# Patient Record
Sex: MALE | Race: WHITE | NOT HISPANIC OR LATINO | Employment: UNEMPLOYED | URBAN - NONMETROPOLITAN AREA
[De-identification: names, ages, dates, MRNs, and addresses within clinical notes are randomized per-mention and may not be internally consistent; named-entity substitution may affect disease eponyms.]

---

## 2017-01-01 ENCOUNTER — LAB REQUISITION (OUTPATIENT)
Dept: LAB | Facility: OTHER | Age: 52
End: 2017-01-01

## 2017-01-01 ENCOUNTER — LAB (OUTPATIENT)
Dept: LAB | Facility: OTHER | Age: 52
End: 2017-01-01

## 2017-01-01 ENCOUNTER — TELEPHONE (OUTPATIENT)
Dept: FAMILY MEDICINE CLINIC | Facility: CLINIC | Age: 52
End: 2017-01-01

## 2017-01-01 ENCOUNTER — OFFICE VISIT (OUTPATIENT)
Dept: FAMILY MEDICINE CLINIC | Facility: CLINIC | Age: 52
End: 2017-01-01

## 2017-01-01 VITALS
HEIGHT: 65 IN | SYSTOLIC BLOOD PRESSURE: 126 MMHG | DIASTOLIC BLOOD PRESSURE: 76 MMHG | HEART RATE: 105 BPM | WEIGHT: 141 LBS | OXYGEN SATURATION: 95 % | TEMPERATURE: 97.6 F | BODY MASS INDEX: 23.49 KG/M2

## 2017-01-01 DIAGNOSIS — N39.0 URINARY TRACT INFECTION: ICD-10-CM

## 2017-01-01 DIAGNOSIS — F10.10 ALCOHOL ABUSE: ICD-10-CM

## 2017-01-01 DIAGNOSIS — F17.200 TOBACCO DEPENDENCE SYNDROME: ICD-10-CM

## 2017-01-01 DIAGNOSIS — R10.13 EPIGASTRIC PAIN: Primary | ICD-10-CM

## 2017-01-01 DIAGNOSIS — B18.2 CHRONIC VIRAL HEPATITIS C (HCC): ICD-10-CM

## 2017-01-01 DIAGNOSIS — R60.1 GENERALIZED EDEMA: ICD-10-CM

## 2017-01-01 DIAGNOSIS — E87.6 HYPOKALEMIA: ICD-10-CM

## 2017-01-01 DIAGNOSIS — B18.0: ICD-10-CM

## 2017-01-01 DIAGNOSIS — Z12.12 SCREENING FOR COLORECTAL CANCER: ICD-10-CM

## 2017-01-01 DIAGNOSIS — Z12.11 SCREENING FOR COLORECTAL CANCER: ICD-10-CM

## 2017-01-01 DIAGNOSIS — K21.9 GASTROESOPHAGEAL REFLUX DISEASE WITHOUT ESOPHAGITIS: ICD-10-CM

## 2017-01-01 DIAGNOSIS — I10 ESSENTIAL (PRIMARY) HYPERTENSION: ICD-10-CM

## 2017-01-01 DIAGNOSIS — R05.9 COUGH: ICD-10-CM

## 2017-01-01 DIAGNOSIS — R10.13 EPIGASTRIC PAIN: ICD-10-CM

## 2017-01-01 DIAGNOSIS — I15.8 OTHER SECONDARY HYPERTENSION: ICD-10-CM

## 2017-01-01 DIAGNOSIS — Z00.00 PREVENTATIVE HEALTH CARE: ICD-10-CM

## 2017-01-01 DIAGNOSIS — J44.9 CHRONIC OBSTRUCTIVE PULMONARY DISEASE, UNSPECIFIED COPD TYPE (HCC): ICD-10-CM

## 2017-01-01 DIAGNOSIS — I10 ESSENTIAL HYPERTENSION: ICD-10-CM

## 2017-01-01 DIAGNOSIS — B37.0 ORAL THRUSH: ICD-10-CM

## 2017-01-01 DIAGNOSIS — E61.2 MAGNESIUM DEFICIENCY: ICD-10-CM

## 2017-01-01 DIAGNOSIS — F03.90 DEMENTIA WITHOUT BEHAVIORAL DISTURBANCE (HCC): ICD-10-CM

## 2017-01-01 DIAGNOSIS — D64.9 ANEMIA: ICD-10-CM

## 2017-01-01 DIAGNOSIS — E55.9 VITAMIN D DEFICIENCY: ICD-10-CM

## 2017-01-01 DIAGNOSIS — Z23 INFLUENZA VACCINE NEEDED: ICD-10-CM

## 2017-01-01 LAB
25(OH)D3 SERPL-MCNC: 13.2 NG/ML (ref 30–100)
25(OH)D3 SERPL-MCNC: 25.4 NG/ML (ref 30–100)
ALBUMIN SERPL-MCNC: 2.4 G/DL (ref 3.2–5.5)
ALBUMIN SERPL-MCNC: 2.4 G/DL (ref 3.2–5.5)
ALBUMIN/GLOB SERPL: 0.5 G/DL (ref 1–3)
ALBUMIN/GLOB SERPL: 0.7 G/DL (ref 1–3)
ALP SERPL-CCNC: 120 U/L (ref 15–121)
ALP SERPL-CCNC: 292 U/L (ref 15–121)
ALT SERPL W P-5'-P-CCNC: 25 U/L (ref 10–60)
ALT SERPL W P-5'-P-CCNC: 36 U/L (ref 10–60)
AMMONIA BLD-SCNC: 46 UMOL/L (ref 9–30)
AMMONIA BLD-SCNC: 53 UMOL/L (ref 9–30)
AMYLASE SERPL-CCNC: 76 U/L (ref 25–140)
ANION GAP SERPL CALCULATED.3IONS-SCNC: 10 MMOL/L (ref 5–15)
ANION GAP SERPL CALCULATED.3IONS-SCNC: 11 MMOL/L (ref 5–15)
ANION GAP SERPL CALCULATED.3IONS-SCNC: 14 MMOL/L (ref 5–15)
ANION GAP SERPL CALCULATED.3IONS-SCNC: 9 MMOL/L (ref 5–15)
ANISOCYTOSIS BLD QL: ABNORMAL
ANISOCYTOSIS BLD QL: NORMAL
ANISOCYTOSIS BLD QL: NORMAL
AST SERPL-CCNC: 115 U/L (ref 10–60)
AST SERPL-CCNC: 143 U/L (ref 10–60)
BACTERIA SPEC AEROBE CULT: NORMAL
BACTERIA UR QL AUTO: ABNORMAL /HPF
BASOPHILS # BLD AUTO: 0.05 10*3/MM3 (ref 0–0.2)
BASOPHILS NFR BLD AUTO: 0.4 % (ref 0–2)
BILIRUB SERPL-MCNC: 18.9 MG/DL (ref 0.2–1)
BILIRUB SERPL-MCNC: 5.2 MG/DL (ref 0.2–1)
BILIRUB UR QL STRIP: ABNORMAL
BUN BLD-MCNC: 10 MG/DL (ref 8–25)
BUN BLD-MCNC: 11 MG/DL (ref 8–25)
BUN BLD-MCNC: 12 MG/DL (ref 8–25)
BUN BLD-MCNC: 13 MG/DL (ref 8–25)
BUN BLD-MCNC: 22 MG/DL (ref 8–25)
BUN BLD-MCNC: <6 MG/DL (ref 8–25)
BUN/CREAT SERPL: 10.8 (ref 7–25)
BUN/CREAT SERPL: 12.9 (ref 7–25)
BUN/CREAT SERPL: 9.1 (ref 7–25)
BUN/CREAT SERPL: 9.2 (ref 7–25)
BUN/CREAT SERPL: 9.2 (ref 7–25)
BUN/CREAT SERPL: ABNORMAL (ref 7–25)
CALCIUM SPEC-SCNC: 8.3 MG/DL (ref 8.4–10.8)
CALCIUM SPEC-SCNC: 8.4 MG/DL (ref 8.4–10.8)
CALCIUM SPEC-SCNC: 8.5 MG/DL (ref 8.4–10.8)
CALCIUM SPEC-SCNC: 8.5 MG/DL (ref 8.4–10.8)
CALCIUM SPEC-SCNC: 8.6 MG/DL (ref 8.4–10.8)
CALCIUM SPEC-SCNC: 9.4 MG/DL (ref 8.4–10.8)
CHLORIDE SERPL-SCNC: 101 MMOL/L (ref 100–112)
CHLORIDE SERPL-SCNC: 101 MMOL/L (ref 100–112)
CHLORIDE SERPL-SCNC: 102 MMOL/L (ref 100–112)
CHLORIDE SERPL-SCNC: 103 MMOL/L (ref 100–112)
CHLORIDE SERPL-SCNC: 104 MMOL/L (ref 100–112)
CHLORIDE SERPL-SCNC: 99 MMOL/L (ref 100–112)
CLARITY UR: CLEAR
CO2 SERPL-SCNC: 20 MMOL/L (ref 20–32)
CO2 SERPL-SCNC: 21 MMOL/L (ref 20–32)
CO2 SERPL-SCNC: 24 MMOL/L (ref 20–32)
CO2 SERPL-SCNC: 24 MMOL/L (ref 20–32)
CO2 SERPL-SCNC: 25 MMOL/L (ref 20–32)
CO2 SERPL-SCNC: 27 MMOL/L (ref 20–32)
COLOR UR: ABNORMAL
CREAT BLD-MCNC: 0.7 MG/DL (ref 0.4–1.3)
CREAT BLD-MCNC: 1.1 MG/DL (ref 0.4–1.3)
CREAT BLD-MCNC: 1.2 MG/DL (ref 0.4–1.3)
CREAT BLD-MCNC: 1.2 MG/DL (ref 0.4–1.3)
CREAT BLD-MCNC: 1.3 MG/DL (ref 0.4–1.3)
CREAT BLD-MCNC: 1.7 MG/DL (ref 0.4–1.3)
DEPRECATED RDW RBC AUTO: 56.6 FL (ref 35.1–43.9)
DEPRECATED RDW RBC AUTO: 64.2 FL (ref 35.1–43.9)
DEPRECATED RDW RBC AUTO: 64.9 FL (ref 35.1–43.9)
DEPRECATED RDW RBC AUTO: 67.1 FL (ref 35.1–43.9)
DEPRECATED RDW RBC AUTO: 76.6 FL (ref 35.1–43.9)
DEPRECATED RDW RBC AUTO: 79.4 FL (ref 35.1–43.9)
EOSINOPHIL # BLD AUTO: 0.09 10*3/MM3 (ref 0–0.7)
EOSINOPHIL # BLD MANUAL: 0.08 10*3/MM3 (ref 0–0.7)
EOSINOPHIL # BLD MANUAL: 0.09 10*3/MM3 (ref 0–0.7)
EOSINOPHIL # BLD MANUAL: 0.14 10*3/MM3 (ref 0–0.7)
EOSINOPHIL # BLD MANUAL: 0.15 10*3/MM3 (ref 0–0.7)
EOSINOPHIL # BLD MANUAL: 0.18 10*3/MM3 (ref 0–0.7)
EOSINOPHIL NFR BLD AUTO: 0.8 % (ref 0–7)
EOSINOPHIL NFR BLD MANUAL: 1 % (ref 0–7)
EOSINOPHIL NFR BLD MANUAL: 2 % (ref 0–7)
EOSINOPHIL NFR BLD MANUAL: 2 % (ref 0–7)
ERYTHROCYTE [DISTWIDTH] IN BLOOD BY AUTOMATED COUNT: 15.5 % (ref 11.5–14.5)
ERYTHROCYTE [DISTWIDTH] IN BLOOD BY AUTOMATED COUNT: 17.6 % (ref 11.5–14.5)
ERYTHROCYTE [DISTWIDTH] IN BLOOD BY AUTOMATED COUNT: 18.7 % (ref 11.5–14.5)
ERYTHROCYTE [DISTWIDTH] IN BLOOD BY AUTOMATED COUNT: 20.3 % (ref 11.5–14.5)
ERYTHROCYTE [DISTWIDTH] IN BLOOD BY AUTOMATED COUNT: 21.7 % (ref 11.5–14.5)
ERYTHROCYTE [DISTWIDTH] IN BLOOD BY AUTOMATED COUNT: 22.7 % (ref 11.5–14.5)
GFR SERPL CREATININE-BSD FRML MDRD: 119 ML/MIN/1.73 (ref 56–130)
GFR SERPL CREATININE-BSD FRML MDRD: 43 ML/MIN/1.73 (ref 56–130)
GFR SERPL CREATININE-BSD FRML MDRD: 58 ML/MIN/1.73 (ref 56–130)
GFR SERPL CREATININE-BSD FRML MDRD: 64 ML/MIN/1.73 (ref 56–130)
GFR SERPL CREATININE-BSD FRML MDRD: 64 ML/MIN/1.73 (ref 56–130)
GFR SERPL CREATININE-BSD FRML MDRD: 71 ML/MIN/1.73 (ref 56–130)
GLOBULIN UR ELPH-MCNC: 3.4 GM/DL (ref 2.5–4.6)
GLOBULIN UR ELPH-MCNC: 4.8 GM/DL (ref 2.5–4.6)
GLUCOSE BLD-MCNC: 117 MG/DL (ref 70–100)
GLUCOSE BLD-MCNC: 85 MG/DL (ref 70–100)
GLUCOSE BLD-MCNC: 86 MG/DL (ref 70–100)
GLUCOSE BLD-MCNC: 91 MG/DL (ref 70–100)
GLUCOSE BLD-MCNC: 95 MG/DL (ref 70–100)
GLUCOSE BLD-MCNC: 95 MG/DL (ref 70–100)
GLUCOSE UR STRIP-MCNC: NEGATIVE MG/DL
HAV IGM SERPL QL IA: NEGATIVE
HBV CORE IGM SERPL QL IA: NEGATIVE
HBV SURFACE AG SERPL QL IA: NEGATIVE
HCT VFR BLD AUTO: 19.5 % (ref 39–49)
HCT VFR BLD AUTO: 20.7 % (ref 39–49)
HCT VFR BLD AUTO: 23.6 % (ref 39–49)
HCT VFR BLD AUTO: 26 % (ref 39–49)
HCT VFR BLD AUTO: 27.7 % (ref 39–49)
HCT VFR BLD AUTO: 35.9 % (ref 39–49)
HCV AB SER DONR QL: NEGATIVE
HGB BLD-MCNC: 13 G/DL (ref 13.7–17.3)
HGB BLD-MCNC: 6.5 G/DL (ref 13.7–17.3)
HGB BLD-MCNC: 6.9 G/DL (ref 13.7–17.3)
HGB BLD-MCNC: 7.7 G/DL (ref 13.7–17.3)
HGB BLD-MCNC: 8.7 G/DL (ref 13.7–17.3)
HGB BLD-MCNC: 9.7 G/DL (ref 13.7–17.3)
HGB UR QL STRIP.AUTO: NEGATIVE
HIV1+2 AB SER QL: NEGATIVE
HYALINE CASTS UR QL AUTO: ABNORMAL /LPF
KETONES UR QL STRIP: ABNORMAL
LEUKOCYTE ESTERASE UR QL STRIP.AUTO: NEGATIVE
LIPASE SERPL-CCNC: 93 U/L (ref 23–300)
LYMPHOCYTES # BLD AUTO: 2.06 10*3/MM3 (ref 0.6–4.2)
LYMPHOCYTES # BLD MANUAL: 1.28 10*3/MM3 (ref 0.6–4.2)
LYMPHOCYTES # BLD MANUAL: 1.85 10*3/MM3 (ref 0.6–4.2)
LYMPHOCYTES # BLD MANUAL: 2.17 10*3/MM3 (ref 0.6–4.2)
LYMPHOCYTES # BLD MANUAL: 2.27 10*3/MM3 (ref 0.6–4.2)
LYMPHOCYTES # BLD MANUAL: 2.31 10*3/MM3 (ref 0.6–4.2)
LYMPHOCYTES NFR BLD AUTO: 18.2 % (ref 10–50)
LYMPHOCYTES NFR BLD MANUAL: 11 % (ref 0–12)
LYMPHOCYTES NFR BLD MANUAL: 12 % (ref 10–50)
LYMPHOCYTES NFR BLD MANUAL: 13 % (ref 0–12)
LYMPHOCYTES NFR BLD MANUAL: 14 % (ref 10–50)
LYMPHOCYTES NFR BLD MANUAL: 15 % (ref 0–12)
LYMPHOCYTES NFR BLD MANUAL: 26 % (ref 10–50)
LYMPHOCYTES NFR BLD MANUAL: 28 % (ref 10–50)
LYMPHOCYTES NFR BLD MANUAL: 31 % (ref 10–50)
LYMPHOCYTES NFR BLD MANUAL: 6 % (ref 0–12)
LYMPHOCYTES NFR BLD MANUAL: 7 % (ref 0–12)
MACROCYTES BLD QL SMEAR: ABNORMAL
MACROCYTES BLD QL SMEAR: ABNORMAL
MAGNESIUM SERPL-MCNC: 2 MG/DL (ref 1.8–2.5)
MCH RBC QN AUTO: 31.8 PG (ref 26.5–34)
MCH RBC QN AUTO: 32.8 PG (ref 26.5–34)
MCH RBC QN AUTO: 33.3 PG (ref 26.5–34)
MCH RBC QN AUTO: 34.5 PG (ref 26.5–34)
MCH RBC QN AUTO: 36.7 PG (ref 26.5–34)
MCH RBC QN AUTO: 37.4 PG (ref 26.5–34)
MCHC RBC AUTO-ENTMCNC: 32.6 G/DL (ref 31.5–36.3)
MCHC RBC AUTO-ENTMCNC: 33.3 G/DL (ref 31.5–36.3)
MCHC RBC AUTO-ENTMCNC: 33.3 G/DL (ref 31.5–36.3)
MCHC RBC AUTO-ENTMCNC: 33.5 G/DL (ref 31.5–36.3)
MCHC RBC AUTO-ENTMCNC: 35 G/DL (ref 31.5–36.3)
MCHC RBC AUTO-ENTMCNC: 36.2 G/DL (ref 31.5–36.3)
MCV RBC AUTO: 102.2 FL (ref 80–98)
MCV RBC AUTO: 103.2 FL (ref 80–98)
MCV RBC AUTO: 103.5 FL (ref 80–98)
MCV RBC AUTO: 104.9 FL (ref 80–98)
MCV RBC AUTO: 94.9 FL (ref 80–98)
MCV RBC AUTO: 98.5 FL (ref 80–98)
MONOCYTES # BLD AUTO: 0.57 10*3/MM3 (ref 0–0.9)
MONOCYTES # BLD AUTO: 0.77 10*3/MM3 (ref 0–0.9)
MONOCYTES # BLD AUTO: 0.92 10*3/MM3 (ref 0–0.9)
MONOCYTES # BLD AUTO: 1.19 10*3/MM3 (ref 0–0.9)
MONOCYTES # BLD AUTO: 1.33 10*3/MM3 (ref 0–0.9)
MONOCYTES # BLD AUTO: 1.58 10*3/MM3 (ref 0–0.9)
MONOCYTES NFR BLD AUTO: 14 % (ref 0–12)
MUCOUS THREADS URNS QL MICRO: ABNORMAL /HPF
NEUTROPHILS # BLD AUTO: 12.47 10*3/MM3 (ref 2–8.6)
NEUTROPHILS # BLD AUTO: 3.93 10*3/MM3 (ref 2–8.6)
NEUTROPHILS # BLD AUTO: 5.07 10*3/MM3 (ref 2–8.6)
NEUTROPHILS # BLD AUTO: 5.18 10*3/MM3 (ref 2–8.6)
NEUTROPHILS # BLD AUTO: 6.6 10*3/MM3 (ref 2–8.6)
NEUTROPHILS # BLD AUTO: 7.51 10*3/MM3 (ref 2–8.6)
NEUTROPHILS NFR BLD AUTO: 66.6 % (ref 37–80)
NEUTROPHILS NFR BLD MANUAL: 56 % (ref 37–80)
NEUTROPHILS NFR BLD MANUAL: 56 % (ref 37–80)
NEUTROPHILS NFR BLD MANUAL: 64 % (ref 37–80)
NEUTROPHILS NFR BLD MANUAL: 68 % (ref 37–80)
NEUTROPHILS NFR BLD MANUAL: 72 % (ref 37–80)
NEUTS BAND NFR BLD MANUAL: 1 % (ref 0–5)
NEUTS BAND NFR BLD MANUAL: 4 % (ref 0–5)
NEUTS BAND NFR BLD MANUAL: 9 % (ref 0–5)
NEUTS HYPERSEG # BLD: ABNORMAL 10*3/UL
NITRITE UR QL STRIP: POSITIVE
PH UR STRIP.AUTO: 6 [PH] (ref 5.5–8)
PLAT MORPH BLD: NORMAL
PLAT MORPH BLD: NORMAL
PLATELET # BLD AUTO: 103 10*3/MM3 (ref 150–450)
PLATELET # BLD AUTO: 127 10*3/MM3 (ref 150–450)
PLATELET # BLD AUTO: 136 10*3/MM3 (ref 150–450)
PLATELET # BLD AUTO: 161 10*3/MM3 (ref 150–450)
PLATELET # BLD AUTO: 170 10*3/MM3 (ref 150–450)
PLATELET # BLD AUTO: 203 10*3/MM3 (ref 150–450)
PMV BLD AUTO: 10 FL (ref 8–12)
PMV BLD AUTO: 10 FL (ref 8–12)
PMV BLD AUTO: 10.1 FL (ref 8–12)
PMV BLD AUTO: 10.4 FL (ref 8–12)
PMV BLD AUTO: 10.7 FL (ref 8–12)
PMV BLD AUTO: 11.2 FL (ref 8–12)
POTASSIUM BLD-SCNC: 2.7 MMOL/L (ref 3.4–5.4)
POTASSIUM BLD-SCNC: 3 MMOL/L (ref 3.4–5.4)
POTASSIUM BLD-SCNC: 3.2 MMOL/L (ref 3.4–5.4)
POTASSIUM BLD-SCNC: 3.3 MMOL/L (ref 3.4–5.4)
POTASSIUM BLD-SCNC: 3.5 MMOL/L (ref 3.4–5.4)
POTASSIUM BLD-SCNC: 4.6 MMOL/L (ref 3.4–5.4)
PROT SERPL-MCNC: 5.8 G/DL (ref 6.7–8.2)
PROT SERPL-MCNC: 7.2 G/DL (ref 6.7–8.2)
PROT UR QL STRIP: ABNORMAL
RBC # BLD AUTO: 1.98 10*6/MM3 (ref 4.37–5.74)
RBC # BLD AUTO: 2 10*6/MM3 (ref 4.37–5.74)
RBC # BLD AUTO: 2.31 10*6/MM3 (ref 4.37–5.74)
RBC # BLD AUTO: 2.64 10*6/MM3 (ref 4.37–5.74)
RBC # BLD AUTO: 2.74 10*6/MM3 (ref 4.37–5.74)
RBC # BLD AUTO: 3.48 10*6/MM3 (ref 4.37–5.74)
RBC # UR: ABNORMAL /HPF
REF LAB TEST METHOD: ABNORMAL
SCAN SLIDE: NORMAL
SMALL PLATELETS BLD QL SMEAR: ABNORMAL
SMALL PLATELETS BLD QL SMEAR: NORMAL
SMALL PLATELETS BLD QL SMEAR: NORMAL
SODIUM BLD-SCNC: 134 MMOL/L (ref 134–146)
SODIUM BLD-SCNC: 134 MMOL/L (ref 134–146)
SODIUM BLD-SCNC: 135 MMOL/L (ref 134–146)
SODIUM BLD-SCNC: 136 MMOL/L (ref 134–146)
SODIUM BLD-SCNC: 138 MMOL/L (ref 134–146)
SODIUM BLD-SCNC: 138 MMOL/L (ref 134–146)
SP GR UR STRIP: 1.01 (ref 1–1.03)
SQUAMOUS #/AREA URNS HPF: ABNORMAL /HPF
TARGETS BLD QL SMEAR: ABNORMAL
TARGETS BLD QL SMEAR: ABNORMAL
TSH SERPL DL<=0.05 MIU/L-ACNC: 2.77 MIU/ML (ref 0.46–4.68)
UROBILINOGEN UR QL STRIP: ABNORMAL
VIT B12 BLD-MCNC: 992 PG/ML (ref 239–931)
WBC MORPH BLD: NORMAL
WBC NRBC COR # BLD: 11.29 10*3/MM3 (ref 3.2–9.8)
WBC NRBC COR # BLD: 15.39 10*3/MM3 (ref 3.2–9.8)
WBC NRBC COR # BLD: 7.01 10*3/MM3 (ref 3.2–9.8)
WBC NRBC COR # BLD: 8.1 10*3/MM3 (ref 3.2–9.8)
WBC NRBC COR # BLD: 8.89 10*3/MM3 (ref 3.2–9.8)
WBC NRBC COR # BLD: 9.16 10*3/MM3 (ref 3.2–9.8)
WBC UR QL AUTO: ABNORMAL /HPF

## 2017-01-01 PROCEDURE — 80048 BASIC METABOLIC PNL TOTAL CA: CPT | Performed by: INTERNAL MEDICINE

## 2017-01-01 PROCEDURE — 90471 IMMUNIZATION ADMIN: CPT | Performed by: FAMILY MEDICINE

## 2017-01-01 PROCEDURE — 85025 COMPLETE CBC W/AUTO DIFF WBC: CPT | Performed by: INTERNAL MEDICINE

## 2017-01-01 PROCEDURE — 83690 ASSAY OF LIPASE: CPT | Performed by: FAMILY MEDICINE

## 2017-01-01 PROCEDURE — 82140 ASSAY OF AMMONIA: CPT | Performed by: INTERNAL MEDICINE

## 2017-01-01 PROCEDURE — 83735 ASSAY OF MAGNESIUM: CPT | Performed by: INTERNAL MEDICINE

## 2017-01-01 PROCEDURE — 84443 ASSAY THYROID STIM HORMONE: CPT | Performed by: INTERNAL MEDICINE

## 2017-01-01 PROCEDURE — 82306 VITAMIN D 25 HYDROXY: CPT | Performed by: FAMILY MEDICINE

## 2017-01-01 PROCEDURE — 85025 COMPLETE CBC W/AUTO DIFF WBC: CPT | Performed by: FAMILY MEDICINE

## 2017-01-01 PROCEDURE — 85007 BL SMEAR W/DIFF WBC COUNT: CPT | Performed by: INTERNAL MEDICINE

## 2017-01-01 PROCEDURE — 82150 ASSAY OF AMYLASE: CPT | Performed by: FAMILY MEDICINE

## 2017-01-01 PROCEDURE — 80053 COMPREHEN METABOLIC PANEL: CPT | Performed by: INTERNAL MEDICINE

## 2017-01-01 PROCEDURE — 90686 IIV4 VACC NO PRSV 0.5 ML IM: CPT | Performed by: FAMILY MEDICINE

## 2017-01-01 PROCEDURE — G0432 EIA HIV-1/HIV-2 SCREEN: HCPCS | Performed by: INTERNAL MEDICINE

## 2017-01-01 PROCEDURE — 82607 VITAMIN B-12: CPT | Performed by: INTERNAL MEDICINE

## 2017-01-01 PROCEDURE — 81001 URINALYSIS AUTO W/SCOPE: CPT | Performed by: INTERNAL MEDICINE

## 2017-01-01 PROCEDURE — 80074 ACUTE HEPATITIS PANEL: CPT | Performed by: INTERNAL MEDICINE

## 2017-01-01 PROCEDURE — 82306 VITAMIN D 25 HYDROXY: CPT | Performed by: INTERNAL MEDICINE

## 2017-01-01 PROCEDURE — 99214 OFFICE O/P EST MOD 30 MIN: CPT | Performed by: FAMILY MEDICINE

## 2017-01-01 PROCEDURE — 87086 URINE CULTURE/COLONY COUNT: CPT | Performed by: INTERNAL MEDICINE

## 2017-01-01 PROCEDURE — 80053 COMPREHEN METABOLIC PANEL: CPT | Performed by: FAMILY MEDICINE

## 2017-01-01 RX ORDER — POTASSIUM CHLORIDE 20 MEQ/1
20 TABLET, EXTENDED RELEASE ORAL DAILY
Qty: 30 TABLET | Refills: 5 | Status: SHIPPED | OUTPATIENT
Start: 2017-01-01 | End: 2017-01-01

## 2017-01-01 RX ORDER — ONDANSETRON 4 MG/1
TABLET, ORALLY DISINTEGRATING ORAL
Qty: 30 TABLET | Refills: 0 | Status: CANCELLED | OUTPATIENT
Start: 2017-01-01

## 2017-01-01 RX ORDER — ALBUTEROL SULFATE 90 UG/1
2 AEROSOL, METERED RESPIRATORY (INHALATION) EVERY 6 HOURS PRN
Qty: 8 G | Refills: 12 | Status: SHIPPED | OUTPATIENT
Start: 2017-01-01 | End: 2018-01-01 | Stop reason: SDUPTHER

## 2017-01-01 RX ORDER — IBUPROFEN 800 MG/1
TABLET ORAL
Qty: 90 TABLET | Refills: 0 | Status: SHIPPED | OUTPATIENT
Start: 2017-01-01 | End: 2017-01-01 | Stop reason: SDUPTHER

## 2017-01-01 RX ORDER — ERGOCALCIFEROL 1.25 MG/1
50000 CAPSULE ORAL
Qty: 30 CAPSULE | Refills: 0 | Status: SHIPPED | OUTPATIENT
Start: 2017-01-01

## 2017-01-01 RX ORDER — OMEPRAZOLE 40 MG/1
40 CAPSULE, DELAYED RELEASE ORAL DAILY
Qty: 30 CAPSULE | Refills: 5 | Status: SHIPPED | OUTPATIENT
Start: 2017-01-01 | End: 2018-01-01 | Stop reason: SDUPTHER

## 2017-01-01 RX ORDER — IBUPROFEN 800 MG/1
800 TABLET ORAL EVERY 8 HOURS PRN
Qty: 90 TABLET | Refills: 0 | Status: SHIPPED | OUTPATIENT
Start: 2017-01-01 | End: 2017-01-01 | Stop reason: SDUPTHER

## 2017-01-01 RX ORDER — METOPROLOL SUCCINATE 25 MG/1
25 TABLET, EXTENDED RELEASE ORAL DAILY
Qty: 30 TABLET | Refills: 5 | Status: SHIPPED | OUTPATIENT
Start: 2017-01-01 | End: 2018-01-01 | Stop reason: SDUPTHER

## 2017-01-01 RX ORDER — ONDANSETRON 4 MG/1
4 TABLET, ORALLY DISINTEGRATING ORAL EVERY 8 HOURS PRN
Qty: 30 TABLET | Refills: 0 | Status: SHIPPED | OUTPATIENT
Start: 2017-01-01 | End: 2017-01-01 | Stop reason: SDUPTHER

## 2017-01-01 RX ORDER — POTASSIUM CHLORIDE 20 MEQ/1
20 TABLET, EXTENDED RELEASE ORAL DAILY
Qty: 30 TABLET | Refills: 5 | Status: SHIPPED | OUTPATIENT
Start: 2017-01-01 | End: 2018-01-01

## 2017-01-01 RX ORDER — ERGOCALCIFEROL 1.25 MG/1
50000 CAPSULE ORAL
Qty: 12 CAPSULE | Refills: 0 | Status: CANCELLED | OUTPATIENT
Start: 2017-01-01

## 2017-01-01 RX ORDER — ONDANSETRON 4 MG/1
4 TABLET, ORALLY DISINTEGRATING ORAL EVERY 8 HOURS PRN
Qty: 30 TABLET | Refills: 0 | Status: SHIPPED | OUTPATIENT
Start: 2017-01-01

## 2017-01-01 RX ORDER — IBUPROFEN 800 MG/1
800 TABLET ORAL EVERY 8 HOURS PRN
Qty: 90 TABLET | Refills: 0 | Status: SHIPPED | OUTPATIENT
Start: 2017-01-01 | End: 2018-01-01

## 2017-03-14 ENCOUNTER — OFFICE VISIT (OUTPATIENT)
Dept: FAMILY MEDICINE CLINIC | Facility: CLINIC | Age: 52
End: 2017-03-14

## 2017-03-14 VITALS
SYSTOLIC BLOOD PRESSURE: 118 MMHG | WEIGHT: 145 LBS | OXYGEN SATURATION: 98 % | HEIGHT: 65 IN | BODY MASS INDEX: 24.16 KG/M2 | DIASTOLIC BLOOD PRESSURE: 70 MMHG | TEMPERATURE: 97.2 F | HEART RATE: 131 BPM

## 2017-03-14 DIAGNOSIS — I10 ESSENTIAL HYPERTENSION: Primary | ICD-10-CM

## 2017-03-14 DIAGNOSIS — R00.0 SINUS TACHYCARDIA: ICD-10-CM

## 2017-03-14 DIAGNOSIS — Z11.59 NEED FOR HEPATITIS C SCREENING TEST: ICD-10-CM

## 2017-03-14 DIAGNOSIS — F10.10 ALCOHOL ABUSE: ICD-10-CM

## 2017-03-14 DIAGNOSIS — T07.XXXA MULTIPLE EXCORIATIONS: ICD-10-CM

## 2017-03-14 DIAGNOSIS — E55.9 VITAMIN D DEFICIENCY: ICD-10-CM

## 2017-03-14 DIAGNOSIS — F17.200 TOBACCO DEPENDENCE SYNDROME: ICD-10-CM

## 2017-03-14 DIAGNOSIS — K21.9 GASTROESOPHAGEAL REFLUX DISEASE WITHOUT ESOPHAGITIS: ICD-10-CM

## 2017-03-14 PROCEDURE — 99203 OFFICE O/P NEW LOW 30 MIN: CPT | Performed by: FAMILY MEDICINE

## 2017-03-14 RX ORDER — LISINOPRIL 10 MG/1
10 TABLET ORAL DAILY
COMMUNITY
End: 2017-03-14

## 2017-03-14 RX ORDER — IBUPROFEN 800 MG/1
800 TABLET ORAL EVERY 6 HOURS PRN
Qty: 90 TABLET | Refills: 0 | Status: SHIPPED | OUTPATIENT
Start: 2017-03-14 | End: 2017-05-12 | Stop reason: SDUPTHER

## 2017-03-14 RX ORDER — OMEPRAZOLE 40 MG/1
40 CAPSULE, DELAYED RELEASE ORAL DAILY
Qty: 30 CAPSULE | Refills: 5 | Status: SHIPPED | OUTPATIENT
Start: 2017-03-14 | End: 2017-05-12 | Stop reason: SDUPTHER

## 2017-03-14 RX ORDER — OMEPRAZOLE 40 MG/1
40 CAPSULE, DELAYED RELEASE ORAL DAILY
COMMUNITY
End: 2017-03-14 | Stop reason: SDUPTHER

## 2017-03-14 RX ORDER — ALBUTEROL SULFATE 90 UG/1
2 AEROSOL, METERED RESPIRATORY (INHALATION) EVERY 4 HOURS PRN
COMMUNITY
End: 2017-03-14 | Stop reason: SDUPTHER

## 2017-03-14 RX ORDER — METOPROLOL SUCCINATE 25 MG/1
25 TABLET, EXTENDED RELEASE ORAL DAILY
Qty: 30 TABLET | Refills: 5 | Status: SHIPPED | OUTPATIENT
Start: 2017-03-14 | End: 2017-05-12 | Stop reason: SDUPTHER

## 2017-03-14 RX ORDER — TRIAMCINOLONE ACETONIDE 0.25 MG/G
CREAM TOPICAL 2 TIMES DAILY
Qty: 15 G | Refills: 0 | Status: SHIPPED | OUTPATIENT
Start: 2017-03-14 | End: 2017-05-12

## 2017-03-14 RX ORDER — ALBUTEROL SULFATE 90 UG/1
2 AEROSOL, METERED RESPIRATORY (INHALATION) EVERY 6 HOURS PRN
Qty: 8 G | Refills: 12 | Status: SHIPPED | OUTPATIENT
Start: 2017-03-14 | End: 2017-05-12 | Stop reason: SDUPTHER

## 2017-03-14 NOTE — PROGRESS NOTES
"Subjective   Chief Complaint   Patient presents with   • Establish Care     Anand Blair is a 51 y.o. male.   Establish Care    History of Present Illness     [facility] - htn, gerd, alcohol abuse, tobacco dependence syndrome, asthma/copd    gerd - controlled with prilosec    htn - controlled with lisinopril and lopressor    Tobacco dependence syndrome - currently smoking 1ppd  Was previously smoking 2ppd    Alcohol abuse - not controlled  Starts drinking around 1pm daily  Does not admit this is a problem for him  Had previous hospitalization for DT's and alcoholic pancreatitis in October 2016    Asthma / copd - uses albuterol inhaler as needed    The following portions of the patient's history were reviewed and updated as appropriate: allergies, current medications, past family history, past medical history, past social history, past surgical history and problem list.    Review of Systems   Constitutional: Negative for appetite change, chills, fatigue and fever.   HENT: Negative for congestion, ear pain, rhinorrhea and sore throat.    Eyes: Negative for pain.   Respiratory: Negative for cough and shortness of breath.    Cardiovascular: Negative for chest pain and palpitations.   Gastrointestinal: Negative for abdominal pain, constipation, diarrhea and nausea.   Genitourinary: Negative for dysuria.   Musculoskeletal: Negative for back pain, joint swelling and neck pain.   Skin: Positive for wound. Negative for rash.   Neurological: Negative for dizziness and headaches.     Objective   Visit Vitals   • /70   • Pulse (!) 131   • Temp 97.2 °F (36.2 °C)   • Ht 65\" (165.1 cm)   • Wt 145 lb (65.8 kg)   • SpO2 98%   • BMI 24.13 kg/m2     Physical Exam   Constitutional: He is oriented to person, place, and time. He appears well-developed and well-nourished.   Alcohol odor   HENT:   Head: Normocephalic and atraumatic.   Eyes: Pupils are equal, round, and reactive to light.   Neck: Normal range of motion. Neck supple. "   Cardiovascular: Normal rate, regular rhythm and normal heart sounds.    Pulmonary/Chest: Breath sounds normal. No respiratory distress. He has no wheezes. He has no rales.   Abdominal: Soft. Bowel sounds are normal.   Musculoskeletal: Normal range of motion.   Neurological: He is alert and oriented to person, place, and time.   Skin: Skin is warm and dry.   Excoriations of bilateral lower extremities   Psychiatric: He has a normal mood and affect.   Nursing note and vitals reviewed.      Assessment/Plan   Problems Addressed this Visit        Cardiovascular and Mediastinum    Sinus tachycardia    Essential hypertension - Primary    Relevant Medications    metoprolol succinate XL (TOPROL XL) 25 MG 24 hr tablet    Other Relevant Orders    Comprehensive Metabolic Panel    CBC & Differential       Digestive    Gastroesophageal reflux disease without esophagitis    Relevant Medications    omeprazole (priLOSEC) 40 MG capsule       Other    Tobacco dependence syndrome    Alcohol abuse      Other Visit Diagnoses     Need for hepatitis C screening test        Relevant Orders    Hepatitis Panel, Acute    Vitamin D deficiency        Relevant Orders    Vitamin D 25 Hydroxy    Multiple excoriations        Relevant Medications    triamcinolone (KENALOG) 0.025 % cream        Stop lisinopril     Start metoprolol XR    Discussed alcohol abuse, recommended cessation, AA and counseling  Patient declined help    Discussed tobacco cessation  Counseled 3 mins    Discussed and recommended screening colonoscopy    Recommended flu, pneumococcal and tetanus    Refilled medications to pharmacy    Lab work ordered    Recheck in 4 weeks for bp and HR

## 2017-03-14 NOTE — PATIENT INSTRUCTIONS
IF YOU SMOKE OR USE TOBACCO PLEASE READ THE FOLLOWING:    Why is smoking bad for me?  Smoking increases the risk of heart disease, lung disease, vascular disease, stroke, and cancer.     If you smoke, STOP!    If you would like more information on quitting smoking, please visit the [employer] website: [email or web address]   This link will provide additional resources including the QUIT line and the Beat the Pack support groups.     For more information:    American Cancer Society  [phone]    American Heart Association  1-511.700.5565

## 2017-05-12 ENCOUNTER — LAB (OUTPATIENT)
Dept: LAB | Facility: OTHER | Age: 52
End: 2017-05-12

## 2017-05-12 ENCOUNTER — OFFICE VISIT (OUTPATIENT)
Dept: FAMILY MEDICINE CLINIC | Facility: CLINIC | Age: 52
End: 2017-05-12

## 2017-05-12 VITALS
HEIGHT: 65 IN | OXYGEN SATURATION: 98 % | HEART RATE: 106 BPM | WEIGHT: 144 LBS | TEMPERATURE: 97 F | SYSTOLIC BLOOD PRESSURE: 130 MMHG | DIASTOLIC BLOOD PRESSURE: 86 MMHG | BODY MASS INDEX: 23.99 KG/M2

## 2017-05-12 DIAGNOSIS — R00.0 SINUS TACHYCARDIA: ICD-10-CM

## 2017-05-12 DIAGNOSIS — Z12.11 SCREENING FOR COLORECTAL CANCER: ICD-10-CM

## 2017-05-12 DIAGNOSIS — Z12.12 SCREENING FOR COLORECTAL CANCER: ICD-10-CM

## 2017-05-12 DIAGNOSIS — I10 ESSENTIAL HYPERTENSION: Primary | ICD-10-CM

## 2017-05-12 DIAGNOSIS — E87.6 HYPOKALEMIA: ICD-10-CM

## 2017-05-12 DIAGNOSIS — Z91.199 MEDICALLY NONCOMPLIANT: ICD-10-CM

## 2017-05-12 DIAGNOSIS — F10.10 ALCOHOL ABUSE: ICD-10-CM

## 2017-05-12 DIAGNOSIS — F17.200 TOBACCO DEPENDENCE SYNDROME: ICD-10-CM

## 2017-05-12 DIAGNOSIS — Z11.59 NEED FOR HEPATITIS C SCREENING TEST: ICD-10-CM

## 2017-05-12 DIAGNOSIS — K21.9 GASTROESOPHAGEAL REFLUX DISEASE WITHOUT ESOPHAGITIS: ICD-10-CM

## 2017-05-12 DIAGNOSIS — I10 ESSENTIAL HYPERTENSION: ICD-10-CM

## 2017-05-12 LAB
25(OH)D3 SERPL-MCNC: <12.8 NG/ML (ref 30–100)
ALBUMIN SERPL-MCNC: 3.1 G/DL (ref 3.2–5.5)
ALBUMIN/GLOB SERPL: 0.7 G/DL (ref 1–3)
ALP SERPL-CCNC: 196 U/L (ref 15–121)
ALT SERPL W P-5'-P-CCNC: 30 U/L (ref 10–60)
ANION GAP SERPL CALCULATED.3IONS-SCNC: 10 MMOL/L (ref 5–15)
AST SERPL-CCNC: 101 U/L (ref 10–60)
BASOPHILS # BLD MANUAL: 0.06 10*3/MM3 (ref 0–0.2)
BASOPHILS NFR BLD AUTO: 1 % (ref 0–2)
BILIRUB SERPL-MCNC: 0.9 MG/DL (ref 0.2–1)
BUN BLD-MCNC: 6 MG/DL (ref 8–25)
BUN/CREAT SERPL: 8.6 (ref 7–25)
CALCIUM SPEC-SCNC: 9.4 MG/DL (ref 8.4–10.8)
CHLORIDE SERPL-SCNC: 104 MMOL/L (ref 100–112)
CO2 SERPL-SCNC: 25 MMOL/L (ref 20–32)
CREAT BLD-MCNC: 0.7 MG/DL (ref 0.4–1.3)
DEPRECATED RDW RBC AUTO: 51.8 FL (ref 35.1–43.9)
EOSINOPHIL # BLD MANUAL: 0.24 10*3/MM3 (ref 0–0.7)
EOSINOPHIL NFR BLD MANUAL: 4 % (ref 0–7)
ERYTHROCYTE [DISTWIDTH] IN BLOOD BY AUTOMATED COUNT: 13.1 % (ref 11.5–14.5)
GFR SERPL CREATININE-BSD FRML MDRD: 119 ML/MIN/1.73 (ref 56–130)
GLOBULIN UR ELPH-MCNC: 4.4 GM/DL (ref 2.5–4.6)
GLUCOSE BLD-MCNC: 139 MG/DL (ref 70–100)
HAV IGM SERPL QL IA: NEGATIVE
HBV CORE IGM SERPL QL IA: NEGATIVE
HBV SURFACE AG SERPL QL IA: NEGATIVE
HCT VFR BLD AUTO: 39.4 % (ref 39–49)
HCV AB SER DONR QL: NEGATIVE
HGB BLD-MCNC: 13.7 G/DL (ref 13.7–17.3)
LYMPHOCYTES # BLD MANUAL: 1.82 10*3/MM3 (ref 0.6–4.2)
LYMPHOCYTES NFR BLD MANUAL: 30 % (ref 10–50)
LYMPHOCYTES NFR BLD MANUAL: 9 % (ref 0–12)
MACROCYTES BLD QL SMEAR: NORMAL
MCH RBC QN AUTO: 38.9 PG (ref 26.5–34)
MCHC RBC AUTO-ENTMCNC: 34.8 G/DL (ref 31.5–36.3)
MCV RBC AUTO: 111.9 FL (ref 80–98)
MONOCYTES # BLD AUTO: 0.55 10*3/MM3 (ref 0–0.9)
NEUTROPHILS # BLD AUTO: 3.39 10*3/MM3 (ref 2–8.6)
NEUTROPHILS NFR BLD MANUAL: 55 % (ref 37–80)
NEUTS BAND NFR BLD MANUAL: 1 % (ref 0–5)
PLAT MORPH BLD: NORMAL
PLATELET # BLD AUTO: 163 10*3/MM3 (ref 150–450)
PMV BLD AUTO: 10.6 FL (ref 8–12)
POTASSIUM BLD-SCNC: 3 MMOL/L (ref 3.4–5.4)
PROT SERPL-MCNC: 7.5 G/DL (ref 6.7–8.2)
RBC # BLD AUTO: 3.52 10*6/MM3 (ref 4.37–5.74)
SODIUM BLD-SCNC: 139 MMOL/L (ref 134–146)
WBC MORPH BLD: NORMAL
WBC NRBC COR # BLD: 6.06 10*3/MM3 (ref 3.2–9.8)

## 2017-05-12 PROCEDURE — 80074 ACUTE HEPATITIS PANEL: CPT | Performed by: FAMILY MEDICINE

## 2017-05-12 PROCEDURE — 82306 VITAMIN D 25 HYDROXY: CPT | Performed by: FAMILY MEDICINE

## 2017-05-12 PROCEDURE — 99214 OFFICE O/P EST MOD 30 MIN: CPT | Performed by: FAMILY MEDICINE

## 2017-05-12 PROCEDURE — 85025 COMPLETE CBC W/AUTO DIFF WBC: CPT | Performed by: FAMILY MEDICINE

## 2017-05-12 PROCEDURE — 80053 COMPREHEN METABOLIC PANEL: CPT | Performed by: FAMILY MEDICINE

## 2017-05-12 RX ORDER — OMEPRAZOLE 40 MG/1
40 CAPSULE, DELAYED RELEASE ORAL DAILY
Qty: 30 CAPSULE | Refills: 5 | Status: SHIPPED | OUTPATIENT
Start: 2017-05-12 | End: 2017-01-01 | Stop reason: SDUPTHER

## 2017-05-12 RX ORDER — TRIAMCINOLONE ACETONIDE 0.25 MG/G
CREAM TOPICAL 2 TIMES DAILY
Qty: 15 G | Refills: 0 | Status: CANCELLED | OUTPATIENT
Start: 2017-05-12

## 2017-05-12 RX ORDER — IBUPROFEN 800 MG/1
800 TABLET ORAL EVERY 6 HOURS PRN
Qty: 90 TABLET | Refills: 0 | Status: SHIPPED | OUTPATIENT
Start: 2017-05-12 | End: 2017-01-01 | Stop reason: SDUPTHER

## 2017-05-12 RX ORDER — ALBUTEROL SULFATE 90 UG/1
2 AEROSOL, METERED RESPIRATORY (INHALATION) EVERY 6 HOURS PRN
Qty: 8 G | Refills: 12 | Status: SHIPPED | OUTPATIENT
Start: 2017-05-12 | End: 2017-01-01 | Stop reason: SDUPTHER

## 2017-05-12 RX ORDER — ONDANSETRON 4 MG/1
4 TABLET, ORALLY DISINTEGRATING ORAL EVERY 8 HOURS PRN
Qty: 30 TABLET | Refills: 0 | Status: SHIPPED | OUTPATIENT
Start: 2017-05-12 | End: 2017-01-01 | Stop reason: SDUPTHER

## 2017-05-12 RX ORDER — POTASSIUM CHLORIDE 20 MEQ/1
20 TABLET, EXTENDED RELEASE ORAL DAILY
Qty: 30 TABLET | Refills: 5 | Status: SHIPPED | OUTPATIENT
Start: 2017-05-12 | End: 2017-01-01 | Stop reason: SDUPTHER

## 2017-05-12 RX ORDER — METOPROLOL SUCCINATE 25 MG/1
25 TABLET, EXTENDED RELEASE ORAL DAILY
Qty: 30 TABLET | Refills: 5 | Status: SHIPPED | OUTPATIENT
Start: 2017-05-12 | End: 2017-01-01 | Stop reason: SDUPTHER

## 2017-05-15 ENCOUNTER — TELEPHONE (OUTPATIENT)
Dept: FAMILY MEDICINE CLINIC | Facility: CLINIC | Age: 52
End: 2017-05-15

## 2017-05-15 RX ORDER — ERGOCALCIFEROL 1.25 MG/1
50000 CAPSULE ORAL
Qty: 12 CAPSULE | Refills: 0 | Status: SHIPPED | OUTPATIENT
Start: 2017-05-15 | End: 2017-01-01

## 2017-10-09 PROBLEM — J44.9 CHRONIC OBSTRUCTIVE PULMONARY DISEASE (HCC): Status: ACTIVE | Noted: 2017-01-01

## 2017-10-09 NOTE — PROGRESS NOTES
Subjective   Chief Complaint   Patient presents with   • Hypertension     5 month follow up   • Med Refill   • Flu Vaccine     Anand Blair is a 51 y.o. male.   Hypertension (5 month follow up); Med Refill; and Flu Vaccine    Cough   This is a new problem. The current episode started in the past 7 days. The problem has been gradually worsening. The problem occurs hourly. The cough is productive of sputum. Associated symptoms include a fever, postnasal drip and wheezing. Pertinent negatives include no chest pain, chills, ear congestion, ear pain, headaches, nasal congestion, rash, rhinorrhea, sore throat or shortness of breath. Nothing aggravates the symptoms. Risk factors for lung disease include smoking/tobacco exposure. The treatment provided no relief.     C/o recent issue with nausea, vomiting, and epigastric pain  He notes sick contacts  His symptoms have since improved but still complaining of abdominal tenderness and pain  History of alcoholic pancreatitis     gerd - controlled with prilosec    htn - controlled with lopressor  Sinus tachycardia - controlled with lopressor  Patient states that he takes his lisinopril and metoprolol as needed not daily  Last dose of lisinopril was over a week ago  Last dose of metoprolol was several days ago    Tobacco dependence syndrome - currently smoking 1ppd  Was previously smoking 2ppd    Alcohol abuse - improved  Had previous hospitalization for DT's and alcoholic pancreatitis in October 2016  States that he quit liquor  But admits to drinking last Sunday, a few beers    Asthma / copd - uses albuterol inhaler as needed    The following portions of the patient's history were reviewed and updated as appropriate: allergies, current medications, past family history, past medical history, past social history, past surgical history and problem list.    Review of Systems   Constitutional: Positive for fever. Negative for appetite change, chills and fatigue.   HENT:  "Positive for postnasal drip. Negative for congestion, ear pain, rhinorrhea and sore throat.    Eyes: Negative for pain.   Respiratory: Positive for cough and wheezing. Negative for shortness of breath.    Cardiovascular: Negative for chest pain and palpitations.   Gastrointestinal: Positive for abdominal pain. Negative for constipation, diarrhea and nausea.   Genitourinary: Negative for dysuria.   Musculoskeletal: Negative for back pain, joint swelling and neck pain.   Skin: Negative for rash.   Neurological: Negative for dizziness and headaches.       Objective   /76  Pulse 105  Temp 97.6 °F (36.4 °C)  Ht 65\" (165.1 cm)  Wt 141 lb (64 kg)  SpO2 95%  BMI 23.46 kg/m2  Physical Exam   Constitutional: He is oriented to person, place, and time. He appears well-developed and well-nourished.   Strong odor of alcohol   HENT:   Head: Normocephalic and atraumatic.   Discoloration on tongue   Eyes: Pupils are equal, round, and reactive to light.   Neck: Normal range of motion. Neck supple.   Cardiovascular: Normal rate, regular rhythm and normal heart sounds.    Pulmonary/Chest: Effort normal. No respiratory distress. He has no wheezes. He has rhonchi in the right lower field and the left lower field. He has no rales.   Abdominal: Soft. Bowel sounds are normal. There is generalized tenderness and tenderness in the epigastric area.   Musculoskeletal: Normal range of motion.   Neurological: He is alert and oriented to person, place, and time.   Skin: Skin is warm and dry.   Psychiatric: He has a normal mood and affect.   Nursing note and vitals reviewed.      Assessment/Plan   Problems Addressed this Visit        Cardiovascular and Mediastinum    Essential hypertension    Relevant Medications    metoprolol succinate XL (TOPROL XL) 25 MG 24 hr tablet       Respiratory    Chronic obstructive pulmonary disease    Relevant Medications    albuterol (PROVENTIL HFA;VENTOLIN HFA) 108 (90 Base) MCG/ACT inhaler       " Digestive    Gastroesophageal reflux disease without esophagitis    Relevant Medications    omeprazole (priLOSEC) 40 MG capsule    Vitamin D deficiency    Relevant Orders    Vitamin D 25 Hydroxy       Other    Tobacco dependence syndrome    Alcohol abuse    Screening for colorectal cancer    Relevant Orders    Ambulatory Referral to General Surgery (Completed)    Hypokalemia      Other Visit Diagnoses     Epigastric pain    -  Primary    Relevant Orders    CBC & Differential    Comprehensive Metabolic Panel    Amylase    Lipase    CBC Auto Differential    Cough        Relevant Orders    XR Chest 2 View    Oral thrush        Relevant Medications    nystatin (MYCOSTATIN) 659712 UNIT/ML suspension    Influenza vaccine needed            Labs ordered    Chest x-ray ordered    Start nystatin    Refilled medications    Flu vaccine  Consent signed    Referral to General surgery for screening for colon cancer    Recheck in 3 months

## 2017-10-10 NOTE — TELEPHONE ENCOUNTER
----- Message from Kesha Zhou MD sent at 10/10/2017  7:58 AM CDT -----  Pancreas enzymes are normal.  cxr - shows copd no active infection.

## 2018-01-01 ENCOUNTER — OFFICE VISIT (OUTPATIENT)
Dept: FAMILY MEDICINE CLINIC | Facility: CLINIC | Age: 53
End: 2018-01-01

## 2018-01-01 ENCOUNTER — LAB (OUTPATIENT)
Dept: LAB | Facility: OTHER | Age: 53
End: 2018-01-01

## 2018-01-01 ENCOUNTER — TELEPHONE (OUTPATIENT)
Dept: FAMILY MEDICINE CLINIC | Facility: CLINIC | Age: 53
End: 2018-01-01

## 2018-01-01 ENCOUNTER — LAB REQUISITION (OUTPATIENT)
Dept: LAB | Facility: OTHER | Age: 53
End: 2018-01-01

## 2018-01-01 VITALS
WEIGHT: 123 LBS | DIASTOLIC BLOOD PRESSURE: 74 MMHG | SYSTOLIC BLOOD PRESSURE: 122 MMHG | HEIGHT: 65 IN | OXYGEN SATURATION: 78 % | BODY MASS INDEX: 20.49 KG/M2 | HEART RATE: 122 BPM | TEMPERATURE: 98.4 F

## 2018-01-01 VITALS
HEIGHT: 65 IN | SYSTOLIC BLOOD PRESSURE: 115 MMHG | TEMPERATURE: 97.7 F | DIASTOLIC BLOOD PRESSURE: 70 MMHG | HEART RATE: 122 BPM | OXYGEN SATURATION: 87 %

## 2018-01-01 DIAGNOSIS — E87.6 HYPOKALEMIA: ICD-10-CM

## 2018-01-01 DIAGNOSIS — B18.2 CHRONIC VIRAL HEPATITIS C (HCC): ICD-10-CM

## 2018-01-01 DIAGNOSIS — J44.9 CHRONIC OBSTRUCTIVE PULMONARY DISEASE, UNSPECIFIED COPD TYPE (HCC): Primary | ICD-10-CM

## 2018-01-01 DIAGNOSIS — Z91.199 MEDICALLY NONCOMPLIANT: ICD-10-CM

## 2018-01-01 DIAGNOSIS — F10.11 ALCOHOL ABUSE, IN REMISSION: ICD-10-CM

## 2018-01-01 DIAGNOSIS — K21.9 GASTROESOPHAGEAL REFLUX DISEASE WITHOUT ESOPHAGITIS: ICD-10-CM

## 2018-01-01 DIAGNOSIS — Z99.81 REQUIRES SUPPLEMENTAL OXYGEN: ICD-10-CM

## 2018-01-01 DIAGNOSIS — K70.30 ALCOHOLIC CIRRHOSIS OF LIVER WITHOUT ASCITES (HCC): ICD-10-CM

## 2018-01-01 DIAGNOSIS — I10 ESSENTIAL HYPERTENSION: ICD-10-CM

## 2018-01-01 DIAGNOSIS — F17.200 TOBACCO DEPENDENCE SYNDROME: ICD-10-CM

## 2018-01-01 DIAGNOSIS — R00.0 SINUS TACHYCARDIA: ICD-10-CM

## 2018-01-01 DIAGNOSIS — R53.81 PHYSICAL DECONDITIONING: ICD-10-CM

## 2018-01-01 DIAGNOSIS — E55.9 VITAMIN D DEFICIENCY: ICD-10-CM

## 2018-01-01 DIAGNOSIS — J44.9 CHRONIC OBSTRUCTIVE PULMONARY DISEASE, UNSPECIFIED COPD TYPE (HCC): ICD-10-CM

## 2018-01-01 DIAGNOSIS — R05.8 PRODUCTIVE COUGH: ICD-10-CM

## 2018-01-01 DIAGNOSIS — K70.31 ALCOHOLIC CIRRHOSIS OF LIVER WITH ASCITES (HCC): Primary | ICD-10-CM

## 2018-01-01 DIAGNOSIS — I10 ESSENTIAL HYPERTENSION: Primary | ICD-10-CM

## 2018-01-01 LAB
25(OH)D3 SERPL-MCNC: 63 NG/ML (ref 30–100)
25(OH)D3 SERPL-MCNC: 67.3 NG/ML (ref 30–100)
AFP-TM SERPL-MCNC: 3.6 NG/ML (ref 0–8.3)
ALBUMIN SERPL-MCNC: 2.5 G/DL (ref 3.2–5.5)
ALBUMIN/GLOB SERPL: 0.5 G/DL (ref 1–3)
ALP SERPL-CCNC: 154 U/L (ref 15–121)
ALT SERPL W P-5'-P-CCNC: 15 U/L (ref 10–60)
ANION GAP SERPL CALCULATED.3IONS-SCNC: 15 MMOL/L (ref 5–15)
AST SERPL-CCNC: 43 U/L (ref 10–60)
BASOPHILS # BLD AUTO: 0.02 10*3/MM3 (ref 0–0.2)
BASOPHILS NFR BLD AUTO: 0.2 % (ref 0–2)
BILIRUB SERPL-MCNC: 3.8 MG/DL (ref 0.2–1)
BUN BLD-MCNC: 9 MG/DL (ref 8–25)
BUN/CREAT SERPL: 5.6 (ref 7–25)
CALCIUM SPEC-SCNC: 8.8 MG/DL (ref 8.4–10.8)
CHLORIDE SERPL-SCNC: 93 MMOL/L (ref 100–112)
CO2 SERPL-SCNC: 22 MMOL/L (ref 20–32)
CREAT BLD-MCNC: 1.6 MG/DL (ref 0.4–1.3)
DEPRECATED RDW RBC AUTO: 54.1 FL (ref 35.1–43.9)
EOSINOPHIL # BLD AUTO: 0.19 10*3/MM3 (ref 0–0.7)
EOSINOPHIL NFR BLD AUTO: 1.9 % (ref 0–7)
ERYTHROCYTE [DISTWIDTH] IN BLOOD BY AUTOMATED COUNT: 16.4 % (ref 11.5–14.5)
GFR SERPL CREATININE-BSD FRML MDRD: 46 ML/MIN/1.73 (ref 56–130)
GLOBULIN UR ELPH-MCNC: 4.7 GM/DL (ref 2.5–4.6)
GLUCOSE BLD-MCNC: 149 MG/DL (ref 70–100)
HCT VFR BLD AUTO: 29.1 % (ref 39–49)
HGB BLD-MCNC: 10.5 G/DL (ref 13.7–17.3)
LYMPHOCYTES # BLD AUTO: 1.95 10*3/MM3 (ref 0.6–4.2)
LYMPHOCYTES NFR BLD AUTO: 19.2 % (ref 10–50)
MCH RBC QN AUTO: 34.7 PG (ref 26.5–34)
MCHC RBC AUTO-ENTMCNC: 36.1 G/DL (ref 31.5–36.3)
MCV RBC AUTO: 96 FL (ref 80–98)
MONOCYTES # BLD AUTO: 1.04 10*3/MM3 (ref 0–0.9)
MONOCYTES NFR BLD AUTO: 10.2 % (ref 0–12)
NEUTROPHILS # BLD AUTO: 6.97 10*3/MM3 (ref 2–8.6)
NEUTROPHILS NFR BLD AUTO: 68.5 % (ref 37–80)
PLATELET # BLD AUTO: 161 10*3/MM3 (ref 150–450)
PMV BLD AUTO: 8.6 FL (ref 8–12)
POTASSIUM BLD-SCNC: 2.5 MMOL/L (ref 3.4–5.4)
PROT SERPL-MCNC: 7.2 G/DL (ref 6.7–8.2)
RBC # BLD AUTO: 3.03 10*6/MM3 (ref 4.37–5.74)
SODIUM BLD-SCNC: 130 MMOL/L (ref 134–146)
VIT A SERPL-MCNC: 9 UG/DL (ref 24–85)
WBC NRBC COR # BLD: 10.17 10*3/MM3 (ref 3.2–9.8)

## 2018-01-01 PROCEDURE — 82306 VITAMIN D 25 HYDROXY: CPT | Performed by: INTERNAL MEDICINE

## 2018-01-01 PROCEDURE — 85025 COMPLETE CBC W/AUTO DIFF WBC: CPT | Performed by: FAMILY MEDICINE

## 2018-01-01 PROCEDURE — 82306 VITAMIN D 25 HYDROXY: CPT | Performed by: FAMILY MEDICINE

## 2018-01-01 PROCEDURE — 99214 OFFICE O/P EST MOD 30 MIN: CPT | Performed by: FAMILY MEDICINE

## 2018-01-01 PROCEDURE — 82105 ALPHA-FETOPROTEIN SERUM: CPT | Performed by: FAMILY MEDICINE

## 2018-01-01 PROCEDURE — 84590 ASSAY OF VITAMIN A: CPT | Performed by: FAMILY MEDICINE

## 2018-01-01 PROCEDURE — 80053 COMPREHEN METABOLIC PANEL: CPT | Performed by: FAMILY MEDICINE

## 2018-01-01 RX ORDER — SPIRONOLACTONE 100 MG/1
100 TABLET, FILM COATED ORAL DAILY
COMMUNITY

## 2018-01-01 RX ORDER — ONDANSETRON 4 MG/1
4 TABLET, FILM COATED ORAL EVERY 8 HOURS PRN
COMMUNITY

## 2018-01-01 RX ORDER — METOPROLOL SUCCINATE 25 MG/1
25 TABLET, EXTENDED RELEASE ORAL DAILY
Qty: 90 TABLET | Refills: 1 | Status: SHIPPED | OUTPATIENT
Start: 2018-01-01

## 2018-01-01 RX ORDER — EPLERENONE 25 MG/1
50 TABLET, FILM COATED ORAL DAILY
COMMUNITY

## 2018-01-01 RX ORDER — FUROSEMIDE 40 MG/1
40 TABLET ORAL 2 TIMES DAILY
COMMUNITY

## 2018-01-01 RX ORDER — MAGNESIUM OXIDE 400 MG/1
400 TABLET ORAL DAILY
COMMUNITY

## 2018-01-01 RX ORDER — ALBUTEROL SULFATE 90 UG/1
2 AEROSOL, METERED RESPIRATORY (INHALATION) EVERY 6 HOURS PRN
Qty: 8 G | Refills: 12 | Status: SHIPPED | OUTPATIENT
Start: 2018-01-01

## 2018-01-01 RX ORDER — IBUPROFEN 400 MG/1
400 TABLET ORAL EVERY 6 HOURS PRN
COMMUNITY

## 2018-01-01 RX ORDER — LACTULOSE 10 G/15ML
20 SOLUTION ORAL 3 TIMES DAILY
Qty: 473 ML | Refills: 5 | Status: SHIPPED | OUTPATIENT
Start: 2018-01-01

## 2018-01-01 RX ORDER — ALBUTEROL SULFATE 2.5 MG/3ML
2.5 SOLUTION RESPIRATORY (INHALATION) EVERY 4 HOURS PRN
Qty: 120 VIAL | Refills: 5 | Status: SHIPPED | OUTPATIENT
Start: 2018-01-01

## 2018-01-01 RX ORDER — OMEPRAZOLE 40 MG/1
40 CAPSULE, DELAYED RELEASE ORAL DAILY
Qty: 30 CAPSULE | Refills: 5 | Status: SHIPPED | OUTPATIENT
Start: 2018-01-01

## 2018-01-01 RX ORDER — IBUPROFEN 800 MG/1
TABLET ORAL
Qty: 90 TABLET | Refills: 0 | Status: SHIPPED | OUTPATIENT
Start: 2018-01-01

## 2018-01-01 RX ORDER — LACTULOSE 10 G/15ML
20 SOLUTION ORAL 2 TIMES DAILY PRN
COMMUNITY
End: 2018-01-01 | Stop reason: SDUPTHER

## 2018-01-01 RX ORDER — POTASSIUM CHLORIDE 3 G/15ML
40 SOLUTION ORAL DAILY
Qty: 900 ML | Refills: 0 | Status: SHIPPED | OUTPATIENT
Start: 2018-01-01

## 2018-01-09 PROBLEM — F10.11 ALCOHOL ABUSE, IN REMISSION: Status: ACTIVE | Noted: 2018-01-01

## 2018-01-09 PROBLEM — Z12.11 SCREENING FOR COLORECTAL CANCER: Status: RESOLVED | Noted: 2017-05-12 | Resolved: 2018-01-01

## 2018-01-09 PROBLEM — Z12.12 SCREENING FOR COLORECTAL CANCER: Status: RESOLVED | Noted: 2017-05-12 | Resolved: 2018-01-01

## 2018-01-09 PROBLEM — K70.30 ALCOHOLIC CIRRHOSIS OF LIVER WITHOUT ASCITES (HCC): Status: ACTIVE | Noted: 2018-01-01

## 2018-01-09 NOTE — PROGRESS NOTES
Subjective   Chief Complaint   Patient presents with   • Hypertension     3 months   • Med Refill   • wants breathing treatment     Anand Blair is a 52 y.o. male.   Hypertension (3 months); Med Refill; and wants breathing treatment    History of Present Illness     Presents today for a follow up visit  Has been living at [facility] since November  gerd - controlled with prilosec    Hypertension  - controlled with lopressor  Sinus tachycardia - controlled with lopressor    Tobacco dependence syndrome - currently smoking 1ppd  Was previously smoking 2ppd    Asthma / copd - uses albuterol inhaler as needed  Asking for a refill  Asking for a nebulizer  Asking for albuterol nebulizer solution  Currently using portable oxygen    Currently living at [facility]  Followed diagnosis of hypomagnesemia and hypokalemia  He was encouraged to be treated in the ER  Dr Velez performed a cholecystectomy  Due to a complication with surgery with a laceration to the colon he was transferred to an assisted living facility - [facility]  Currently working with physical therapy    Medication list from [facility]:  Lasix 60mg daily  Spironolactone 100mg - 2 tablets daily  Thiamine 100mg daily  Vitamin D 1000 units daily  Lactulose 15ml BID  Magnesium oxide 800mg BID  Morphine sulfate 30mg BID  prilosec 40mg BID  Senna-docusate sodium 8.6-50mg BID  Ventolin inhaler - 2 puffs every 4hrs PRN  zofran 4mg every 6hrs PRN    Having a difficult time finding out if he is supposed to have a follow up scheduled with general surgery or [facility] regarding HCV and liver cirrhosis.  History of hepatic encephalopathy, alcoholic cirrhosis, alcoholic pancreatitis  Followed by [facility] by Dr Devine  Last office visit was 12/19/2017 - with review of office notes this was discussed:  Encouraged patient to restart AA and abstain from alcohol use  Continue with lactulose  Recommended 3-4 nutritional supplements  "daily, restrict salt intake  Recommended to get annual influenza, pneumococcal, and hepatitis B series.  Recheck recommended in 4 weeks from 12/19/2017    The following portions of the patient's history were reviewed and updated as appropriate: allergies, current medications, past family history, past medical history, past social history, past surgical history and problem list.    Review of Systems   Constitutional: Negative for appetite change, chills, fatigue and fever.   HENT: Negative for congestion, ear pain, rhinorrhea and sore throat.    Eyes: Negative for pain.   Respiratory: Positive for cough, shortness of breath and wheezing.    Cardiovascular: Negative for chest pain and palpitations.   Gastrointestinal: Negative for abdominal pain, constipation, diarrhea and nausea.   Genitourinary: Negative for dysuria.   Musculoskeletal: Negative for back pain, joint swelling and neck pain.   Skin: Negative for rash.   Neurological: Negative for dizziness and headaches.       Objective   /70  Pulse (!) 122  Temp 97.7 °F (36.5 °C)  Ht 165.1 cm (65\")  SpO2 (!) 87%  Physical Exam   Constitutional: He is oriented to person, place, and time. He appears well-developed and well-nourished.   HENT:   Head: Normocephalic and atraumatic.   Eyes: Pupils are equal, round, and reactive to light.   Neck: Normal range of motion. Neck supple.   Cardiovascular: Regular rhythm and normal heart sounds.  Tachycardia present.    Pulmonary/Chest: No respiratory distress. He has wheezes. He has rales.   Abdominal: Soft. Bowel sounds are normal.   Musculoskeletal:   wheelchair   Neurological: He is alert and oriented to person, place, and time.   Skin: Skin is warm and dry.   Psychiatric: He has a normal mood and affect.   Nursing note and vitals reviewed.      Assessment/Plan   Problems Addressed this Visit        Cardiovascular and Mediastinum    Sinus tachycardia    Relevant Medications    metoprolol succinate XL (TOPROL XL) 25 " MG 24 hr tablet    Essential hypertension - Primary    Relevant Medications    metoprolol succinate XL (TOPROL XL) 25 MG 24 hr tablet       Respiratory    Chronic obstructive pulmonary disease    Relevant Medications    albuterol (PROVENTIL HFA;VENTOLIN HFA) 108 (90 Base) MCG/ACT inhaler    albuterol (PROVENTIL) (2.5 MG/3ML) 0.083% nebulizer solution       Digestive    Alcoholic cirrhosis of liver without ascites       Other    Tobacco dependence syndrome    Alcohol abuse, in remission      Other Visit Diagnoses     Productive cough        Relevant Orders    XR Chest 2 View (Completed)        Call made to [facility] - scheduled patient with a follow up Friday 1/12 at 3:40pm  Patient contacted and provided appointment information    cxr ordered    Start nebulizer   Start albuterol nebulizer  Refilled inhaler    Medical release for medical records from:  [facility], [facility], [facility] [place]    Monitor HR - needs to restart metoprolol    Recheck in 4 weeks

## 2018-02-23 NOTE — PROGRESS NOTES
"Subjective   Chief Complaint   Patient presents with   • Follow-up     [facility] f/u      Anand Blair is a 52 y.o. male.   Follow-up ([facility] f/u )    History of Present Illness     Presents today after discharge from [facility] on 2/01  Reviewed active medication list with patient and updated his medical chart  Asking for a refill on his lactulose - uses this TID    Asking for lab work that has been ordered by [facility] for liver cirrhosis  Home health visits twice per day    Complaining of shortness of breath  Would like to be on oxygen if he qualifies    The following portions of the patient's history were reviewed and updated as appropriate: allergies, current medications, past family history, past medical history, past social history, past surgical history and problem list.    Review of Systems   Constitutional: Negative for appetite change, chills, fatigue and fever.   HENT: Negative for congestion, ear pain, rhinorrhea and sore throat.    Eyes: Negative for pain.   Respiratory: Positive for cough and shortness of breath.    Cardiovascular: Negative for chest pain and palpitations.   Gastrointestinal: Negative for abdominal pain, constipation, diarrhea and nausea.   Genitourinary: Negative for dysuria.   Musculoskeletal: Negative for back pain, joint swelling and neck pain.   Skin: Negative for rash.   Neurological: Positive for weakness. Negative for dizziness and headaches.       Objective   /74  Pulse (!) 122  Temp 98.4 °F (36.9 °C)  Ht 165.1 cm (65\")  Wt 55.8 kg (123 lb)  SpO2 (!) 78% Comment: 78% oxygen taken while walking down the [place].  BMI 20.47 kg/m2  Physical Exam   Constitutional: He is oriented to person, place, and time. He appears well-developed and well-nourished.   HENT:   Head: Normocephalic and atraumatic.   Mouth/Throat: Abnormal dentition. Dental caries present.   Poor dentition   Eyes: Pupils are equal, round, and " reactive to light.   Neck: Normal range of motion. Neck supple.   Cardiovascular: Regular rhythm and normal heart sounds.  Tachycardia present.    Pulmonary/Chest: Breath sounds normal. No respiratory distress. He has no wheezes. He has no rales.   Abdominal: Soft. Bowel sounds are normal.   Musculoskeletal: Normal range of motion.   Lower extremity muscle weakness  Using wheelchair   Neurological: He is alert and oriented to person, place, and time.   Skin: Skin is warm and dry.   Psychiatric: He has a normal mood and affect.   Nursing note and vitals reviewed.      Assessment/Plan   Problems Addressed this Visit        Cardiovascular and Mediastinum    Sinus tachycardia    Essential hypertension    Relevant Medications    eplerenone (INSPRA) 25 MG tablet    furosemide (LASIX) 40 MG tablet    spironolactone (ALDACTONE) 100 MG tablet       Respiratory    Chronic obstructive pulmonary disease - Primary       Digestive    Vitamin D deficiency    Relevant Orders    Vitamin D 25 Hydroxy    Alcoholic cirrhosis of liver without ascites    Relevant Orders    AFP Tumor Marker    Comprehensive Metabolic Panel (Completed)    CBC & Differential (Completed)    Vitamin A       Other    Tobacco dependence syndrome    Medically noncompliant    Hypokalemia      Other Visit Diagnoses     Physical deconditioning        Requires supplemental oxygen            Oxygen ordered and discussed with community oxygen - script sent  Patient directed to proceed to  supplies    Home health - already in place, needs to work on muscle strengthening, PT/OT    Labs ordered    Restart lactulose    Patient called about his potassium level - supplement sent into pharmacy to start today    Follow up with GI as scheduled    I advised the patient of the risks in continuing to use tobacco, and I provided this patient with smoking cessation educational materials.    During this visit, I spent < 3 minutes counseling the patient regarding smoking  cessation.    Recheck in 3 months

## 2018-02-26 NOTE — TELEPHONE ENCOUNTER
----- Message from Kesha Zhou MD sent at 2/23/2018  4:18 PM CST -----  ow potassium - supplement called into pharmacy.

## 2020-09-26 NOTE — TELEPHONE ENCOUNTER
Called Dr. Stephenson at [facility] to schedule Mr. Blair a follow up appt for Jan 12th at 3:40. I called Mr. Blair and told him of his appt time and date. I also gave him the phone number in case he needs to reschedule. Pt stated his understanding. 1/9/18  
Name band;